# Patient Record
Sex: MALE | Race: WHITE | NOT HISPANIC OR LATINO | Employment: FULL TIME | URBAN - METROPOLITAN AREA
[De-identification: names, ages, dates, MRNs, and addresses within clinical notes are randomized per-mention and may not be internally consistent; named-entity substitution may affect disease eponyms.]

---

## 2023-09-17 ENCOUNTER — HOSPITAL ENCOUNTER (EMERGENCY)
Facility: HOSPITAL | Age: 23
Discharge: HOME | End: 2023-09-17
Attending: EMERGENCY MEDICINE | Admitting: EMERGENCY MEDICINE
Payer: MEDICAID

## 2023-09-17 ENCOUNTER — APPOINTMENT (OUTPATIENT)
Dept: GENERAL RADIOLOGY | Facility: HOSPITAL | Age: 23
End: 2023-09-17
Payer: MEDICAID

## 2023-09-17 VITALS
RESPIRATION RATE: 17 BRPM | DIASTOLIC BLOOD PRESSURE: 78 MMHG | HEIGHT: 72 IN | TEMPERATURE: 99 F | HEART RATE: 87 BPM | BODY MASS INDEX: 37.93 KG/M2 | SYSTOLIC BLOOD PRESSURE: 145 MMHG | WEIGHT: 280 LBS | OXYGEN SATURATION: 98 %

## 2023-09-17 DIAGNOSIS — S93.601A SPRAIN OF RIGHT FOOT, INITIAL ENCOUNTER: Primary | ICD-10-CM

## 2023-09-17 PROCEDURE — 99283 EMERGENCY DEPT VISIT LOW MDM: CPT

## 2023-09-17 PROCEDURE — 73630 X-RAY EXAM OF FOOT: CPT

## 2023-09-17 PROCEDURE — 73610 X-RAY EXAM OF ANKLE: CPT

## 2023-09-17 RX ORDER — ACETAMINOPHEN 325 MG/1
650 TABLET ORAL ONCE
Status: COMPLETED | OUTPATIENT
Start: 2023-09-17 | End: 2023-09-17

## 2023-09-17 RX ADMIN — ACETAMINOPHEN 650 MG: 325 TABLET, FILM COATED ORAL at 22:28

## 2023-09-18 NOTE — DISCHARGE INSTRUCTIONS
Rest, ice and elevate your injury. Take Tylenol and Motrin for pain. Wear post-op shoe for 1 week. Follow-up with your Doctor if symptoms persist.

## 2023-09-18 NOTE — ED NOTES
Pt reports to the ED for c/o right foot pain. Pt states that last night he was on his porch and tripped off the step, landing on foot.  Today, pt noticed pain with ambulation and bruising to the top of right foot.  Pt states at rest, pain is 0 but he has pain with ambulation. +pedal pulses, good cap refill.

## 2023-09-18 NOTE — FSED PROVIDER NOTE
Subjective   History of Present Illness  23 yom complains of right foot pain. He reports that last night he stepped off a porch wrong and injured his foot. This morning when he woke up he noticed it hurt to walk. The patient has treated the pain with Tylenol this morning and ice last night. No other injury.     Review of Systems   Constitutional: Negative.    Musculoskeletal:  Negative for back pain and neck pain.        Right foot pain   Neurological: Negative.  Negative for weakness, numbness and headaches.   All other systems reviewed and are negative.    History reviewed. No pertinent past medical history.    No Known Allergies    History reviewed. No pertinent surgical history.    History reviewed. No pertinent family history.    Social History     Socioeconomic History   • Marital status: Single   Tobacco Use   • Smoking status: Never   • Smokeless tobacco: Never   Vaping Use   • Vaping Use: Never used   Substance and Sexual Activity   • Alcohol use: Never   • Drug use: Never   • Sexual activity: Defer           Objective   Physical Exam  Vitals reviewed.   Constitutional:       Appearance: Normal appearance.   HENT:      Head: Normocephalic and atraumatic.      Mouth/Throat:      Mouth: Mucous membranes are moist.      Pharynx: Oropharynx is clear.   Eyes:      Extraocular Movements: Extraocular movements intact.      Conjunctiva/sclera: Conjunctivae normal.   Cardiovascular:      Rate and Rhythm: Normal rate and regular rhythm.      Pulses: Normal pulses.      Heart sounds: Normal heart sounds.   Pulmonary:      Effort: Pulmonary effort is normal.      Breath sounds: Normal breath sounds.   Musculoskeletal:         General: Swelling and tenderness present. Normal range of motion.      Cervical back: Normal range of motion and neck supple.      Right foot: Normal range of motion and normal capillary refill. Swelling and tenderness present. No deformity, foot drop, bony tenderness or crepitus. Normal pulse.         Feet:    Feet:      Right foot:      Skin integrity: Skin integrity normal.   Skin:     General: Skin is warm and dry.      Capillary Refill: Capillary refill takes less than 2 seconds.   Neurological:      General: No focal deficit present.      Mental Status: He is alert and oriented to person, place, and time.      Sensory: No sensory deficit.      Motor: No weakness.       Procedures           ED Course                                           Medical Decision Making  Workup: XR Ankle and Foot  Findings: No fracture or dislocation  Presentation most consistent with FootSprain.  Patient does not currently demonstrate complications of sprain such as compartment syndrome, arterial or nerve injury.  Disposition: Discharge. Supportive bracing provided. WBAT. RICE. Strict return precautions and instructions to follow up with primary MD within 24-48 hours for further evaluation    Amount and/or Complexity of Data Reviewed  Radiology: ordered.    Risk  OTC drugs.        Final diagnoses:   None       ED Disposition  ED Disposition       None            No follow-up provider specified.       Medication List      No changes were made to your prescriptions during this visit.